# Patient Record
Sex: MALE | Race: WHITE | NOT HISPANIC OR LATINO | Employment: OTHER | URBAN - METROPOLITAN AREA
[De-identification: names, ages, dates, MRNs, and addresses within clinical notes are randomized per-mention and may not be internally consistent; named-entity substitution may affect disease eponyms.]

---

## 2022-05-29 ENCOUNTER — HOSPITAL ENCOUNTER (EMERGENCY)
Facility: HOSPITAL | Age: 77
Discharge: HOME | End: 2022-05-29
Attending: FAMILY MEDICINE

## 2022-05-29 VITALS
RESPIRATION RATE: 20 BRPM | BODY MASS INDEX: 25.31 KG/M2 | HEIGHT: 71 IN | TEMPERATURE: 98 F | DIASTOLIC BLOOD PRESSURE: 65 MMHG | SYSTOLIC BLOOD PRESSURE: 122 MMHG | OXYGEN SATURATION: 96 % | HEART RATE: 70 BPM | WEIGHT: 180.75 LBS

## 2022-05-29 DIAGNOSIS — N32.0 BLADDER OUTLET OBSTRUCTION: Primary | ICD-10-CM

## 2022-05-29 DIAGNOSIS — Z97.8 FOLEY CATHETER PRESENT: ICD-10-CM

## 2022-05-29 LAB
ALBUMIN SERPL BCP-MCNC: 3.6 G/DL (ref 3.5–5.2)
ALP SERPL-CCNC: 101 U/L (ref 55–135)
ALT SERPL W/O P-5'-P-CCNC: 19 U/L (ref 10–44)
ANION GAP SERPL CALC-SCNC: 15 MMOL/L (ref 8–16)
AST SERPL-CCNC: 17 U/L (ref 10–40)
BACTERIA #/AREA URNS HPF: ABNORMAL /HPF
BASOPHILS # BLD AUTO: 0.01 K/UL (ref 0–0.2)
BASOPHILS NFR BLD: 0.1 % (ref 0–1.9)
BILIRUB SERPL-MCNC: 0.7 MG/DL (ref 0.1–1)
BILIRUB UR QL STRIP: NEGATIVE
BUN SERPL-MCNC: 31 MG/DL (ref 8–23)
CALCIUM SERPL-MCNC: 9.4 MG/DL (ref 8.7–10.5)
CHLORIDE SERPL-SCNC: 102 MMOL/L (ref 95–110)
CLARITY UR: CLEAR
CO2 SERPL-SCNC: 22 MMOL/L (ref 23–29)
COLOR UR: YELLOW
CREAT SERPL-MCNC: 2 MG/DL (ref 0.5–1.4)
DIFFERENTIAL METHOD: ABNORMAL
EOSINOPHIL # BLD AUTO: 0 K/UL (ref 0–0.5)
EOSINOPHIL NFR BLD: 0.4 % (ref 0–8)
ERYTHROCYTE [DISTWIDTH] IN BLOOD BY AUTOMATED COUNT: 13.2 % (ref 11.5–14.5)
EST. GFR  (AFRICAN AMERICAN): 36.1 ML/MIN/1.73 M^2
EST. GFR  (NON AFRICAN AMERICAN): 31.3 ML/MIN/1.73 M^2
GLUCOSE SERPL-MCNC: 394 MG/DL (ref 70–110)
GLUCOSE UR QL STRIP: ABNORMAL
HCT VFR BLD AUTO: 33 % (ref 40–54)
HGB BLD-MCNC: 11.5 G/DL (ref 14–18)
HGB UR QL STRIP: ABNORMAL
IMM GRANULOCYTES # BLD AUTO: 0.01 K/UL (ref 0–0.04)
IMM GRANULOCYTES NFR BLD AUTO: 0.1 % (ref 0–0.5)
KETONES UR QL STRIP: NEGATIVE
LEUKOCYTE ESTERASE UR QL STRIP: ABNORMAL
LYMPHOCYTES # BLD AUTO: 1.4 K/UL (ref 1–4.8)
LYMPHOCYTES NFR BLD: 20.4 % (ref 18–48)
MCH RBC QN AUTO: 30 PG (ref 27–31)
MCHC RBC AUTO-ENTMCNC: 34.8 G/DL (ref 32–36)
MCV RBC AUTO: 86 FL (ref 82–98)
MICROSCOPIC COMMENT: ABNORMAL
MONOCYTES # BLD AUTO: 0.5 K/UL (ref 0.3–1)
MONOCYTES NFR BLD: 7.3 % (ref 4–15)
NEUTROPHILS # BLD AUTO: 4.8 K/UL (ref 1.8–7.7)
NEUTROPHILS NFR BLD: 71.7 % (ref 38–73)
NITRITE UR QL STRIP: NEGATIVE
NRBC BLD-RTO: 0 /100 WBC
PH UR STRIP: 7 [PH] (ref 5–8)
PLATELET # BLD AUTO: 173 K/UL (ref 150–450)
PMV BLD AUTO: 9.1 FL (ref 9.2–12.9)
POCT GLUCOSE: 327 MG/DL (ref 70–110)
POCT GLUCOSE: 347 MG/DL (ref 70–110)
POTASSIUM SERPL-SCNC: 3.5 MMOL/L (ref 3.5–5.1)
PROT SERPL-MCNC: 6.9 G/DL (ref 6–8.4)
PROT UR QL STRIP: NEGATIVE
RBC # BLD AUTO: 3.83 M/UL (ref 4.6–6.2)
RBC #/AREA URNS HPF: 9 /HPF (ref 0–4)
SODIUM SERPL-SCNC: 139 MMOL/L (ref 136–145)
SP GR UR STRIP: 1.01 (ref 1–1.03)
URN SPEC COLLECT METH UR: ABNORMAL
UROBILINOGEN UR STRIP-ACNC: NEGATIVE EU/DL
WBC # BLD AUTO: 6.67 K/UL (ref 3.9–12.7)
WBC #/AREA URNS HPF: 14 /HPF (ref 0–5)

## 2022-05-29 PROCEDURE — 85025 COMPLETE CBC W/AUTO DIFF WBC: CPT | Performed by: FAMILY MEDICINE

## 2022-05-29 PROCEDURE — 74176 CT ABD & PELVIS W/O CONTRAST: CPT | Mod: TC

## 2022-05-29 PROCEDURE — 74176 CT ABD & PELVIS W/O CONTRAST: CPT | Mod: 26,,, | Performed by: RADIOLOGY

## 2022-05-29 PROCEDURE — 80053 COMPREHEN METABOLIC PANEL: CPT | Performed by: FAMILY MEDICINE

## 2022-05-29 PROCEDURE — 96372 THER/PROPH/DIAG INJ SC/IM: CPT | Performed by: FAMILY MEDICINE

## 2022-05-29 PROCEDURE — 74176 CT ABDOMEN PELVIS WITHOUT CONTRAST: ICD-10-PCS | Mod: 26,,, | Performed by: RADIOLOGY

## 2022-05-29 PROCEDURE — 82962 GLUCOSE BLOOD TEST: CPT

## 2022-05-29 PROCEDURE — 99285 EMERGENCY DEPT VISIT HI MDM: CPT | Mod: 25

## 2022-05-29 PROCEDURE — 87086 URINE CULTURE/COLONY COUNT: CPT | Performed by: FAMILY MEDICINE

## 2022-05-29 PROCEDURE — 25000003 PHARM REV CODE 250: Performed by: FAMILY MEDICINE

## 2022-05-29 PROCEDURE — 81000 URINALYSIS NONAUTO W/SCOPE: CPT | Performed by: FAMILY MEDICINE

## 2022-05-29 PROCEDURE — 63600175 PHARM REV CODE 636 W HCPCS: Performed by: FAMILY MEDICINE

## 2022-05-29 PROCEDURE — 51702 INSERT TEMP BLADDER CATH: CPT

## 2022-05-29 RX ORDER — AMLODIPINE BESYLATE 5 MG/1
5 TABLET ORAL NIGHTLY
COMMUNITY

## 2022-05-29 RX ORDER — LANOLIN ALCOHOL/MO/W.PET/CERES
100 CREAM (GRAM) TOPICAL DAILY
COMMUNITY

## 2022-05-29 RX ORDER — AMOXICILLIN 250 MG/1
500 CAPSULE ORAL
Status: COMPLETED | OUTPATIENT
Start: 2022-05-29 | End: 2022-05-29

## 2022-05-29 RX ORDER — TEMAZEPAM 7.5 MG/1
15 CAPSULE ORAL NIGHTLY PRN
COMMUNITY

## 2022-05-29 RX ORDER — METFORMIN HYDROCHLORIDE 1000 MG/1
500 TABLET ORAL 2 TIMES DAILY WITH MEALS
COMMUNITY

## 2022-05-29 RX ORDER — SOLIFENACIN SUCCINATE 5 MG/1
10 TABLET, FILM COATED ORAL 2 TIMES DAILY
COMMUNITY

## 2022-05-29 RX ORDER — SIMVASTATIN 40 MG/1
40 TABLET, FILM COATED ORAL NIGHTLY
COMMUNITY

## 2022-05-29 RX ORDER — FOSINOPRIL SODIUM 40 MG/1
40 TABLET ORAL DAILY
COMMUNITY

## 2022-05-29 RX ADMIN — AMOXICILLIN 500 MG: 250 CAPSULE ORAL at 05:05

## 2022-05-29 RX ADMIN — INSULIN HUMAN 8 UNITS: 100 INJECTION, SOLUTION PARENTERAL at 04:05

## 2022-05-29 NOTE — ED NOTES
Patient states feeling much better after bladder emptied. NAD awaiting radiology for further orders. Denies any needs at this time.

## 2022-05-29 NOTE — ED NOTES
CBG remains elevated. Dr. Bee notified. Patient states glucophage due in 2 hours so will not give further insulin coverage. Leg bag attached for indwellilng catheter with instructions for use. Follow up explained with urology for removal. Voices understanding.

## 2022-05-30 LAB — BACTERIA UR CULT: NO GROWTH

## 2022-05-30 NOTE — ED PROVIDER NOTES
Encounter Date: 5/29/2022       History     Chief Complaint   Patient presents with    Flank Pain     Right flank pain intermittently today.  Now constant.  Also urinary incontinence for 50 days progressively worsening. Slightly relieved if on right side.     77 yo presents to ED in acute distress complaining of pain in the R flank today, he has had progressive urinary incontinence over the last 50 days, he is normally seen at the [facility] and apparently has a pending appointment with the urologist they are trying to get 1 scheduled he has no history of fever chills nausea vomiting or diarrhea        Review of patient's allergies indicates:  No Known Allergies  No past medical history on file.  No past surgical history on file.  No family history on file.     Review of Systems   Constitutional: Negative for fever.   HENT: Negative for sore throat.    Eyes: Negative for itching.   Respiratory: Negative for shortness of breath.    Cardiovascular: Negative for chest pain.   Gastrointestinal: Negative for nausea.   Endocrine: Negative for heat intolerance.   Genitourinary: Positive for frequency. Negative for dysuria.   Musculoskeletal: Negative for back pain.   Skin: Negative for rash.   Neurological: Negative for dizziness and weakness.   Hematological: Does not bruise/bleed easily.       Physical Exam     Initial Vitals [05/29/22 0304]   BP Pulse Resp Temp SpO2   (!) 168/75 70 20 97.8 °F (36.6 °C) (!) 94 %      MAP       --         Physical Exam    Nursing note and vitals reviewed.  Constitutional: He appears well-developed and well-nourished. He is not diaphoretic. No distress.   HENT:   Head: Normocephalic and atraumatic.   Nose: Nose normal.   Mouth/Throat: Oropharynx is clear and moist. No oropharyngeal exudate.   Eyes: EOM are normal.   Neck: Neck supple. No tracheal deviation present.   Normal range of motion.  Cardiovascular: Normal rate and regular rhythm.   No murmur heard.  Pulmonary/Chest:  Breath sounds normal. No stridor. No respiratory distress. He has no rales.   Abdominal: Abdomen is soft. He exhibits distension. He exhibits no mass. There is no abdominal tenderness.   Positive but large suprapubic mass which may be distended bladder There is no rebound.   Musculoskeletal:         General: No edema. Normal range of motion.      Cervical back: Normal range of motion and neck supple.     Lymphadenopathy:     He has no cervical adenopathy.   Neurological: He is alert and oriented to person, place, and time. He has normal strength.   Skin: Skin is warm and dry. Capillary refill takes less than 2 seconds. No pallor.   Psychiatric: He has a normal mood and affect.         ED Course   Procedures  Labs Reviewed   CBC W/ AUTO DIFFERENTIAL - Abnormal; Notable for the following components:       Result Value    RBC 3.83 (*)     Hemoglobin 11.5 (*)     Hematocrit 33.0 (*)     MPV 9.1 (*)     All other components within normal limits   COMPREHENSIVE METABOLIC PANEL - Abnormal; Notable for the following components:    CO2 22 (*)     Glucose 394 (*)     BUN 31 (*)     Creatinine 2.0 (*)     eGFR if  36.1 (*)     eGFR if non  31.3 (*)     All other components within normal limits   URINALYSIS, REFLEX TO URINE CULTURE - Abnormal; Notable for the following components:    Glucose, UA 2+ (*)     Occult Blood UA 2+ (*)     Leukocytes, UA 1+ (*)     All other components within normal limits    Narrative:     Preferred Collection Type->Urine, Clean Catch  Specimen Source->Urine   URINALYSIS MICROSCOPIC - Abnormal; Notable for the following components:    RBC, UA 9 (*)     WBC, UA 14 (*)     All other components within normal limits    Narrative:     Preferred Collection Type->Urine, Clean Catch  Specimen Source->Urine   POCT GLUCOSE - Abnormal; Notable for the following components:    POCT Glucose 327 (*)     All other components within normal limits   POCT GLUCOSE - Abnormal; Notable for  the following components:    POCT Glucose 347 (*)     All other components within normal limits   CULTURE, URINE          Imaging Results          CT Abdomen Pelvis  Without Contrast (Final result)  Result time 05/29/22 08:21:36    Final result by Pretty Phillips MD (05/29/22 08:21:36)                 Impression:      Mild bilateral hydronephrosis and hydroureter of uncertain etiology.  No obstructing ureteral calculus identified.  Follow-up with retrograde pyelogram may be appropriate.    Probable cyst in the liver may be confirmed with follow-up ultrasound.    Atherosclerosis.  Colonic diverticula.  Enlarged prostate.      Electronically signed by: Pretty Phillips  Date:    05/29/2022  Time:    08:21             Narrative:    EXAMINATION:  CT ABDOMEN PELVIS WITHOUT CONTRAST    CLINICAL HISTORY:  Abdominal pain, acute, nonlocalized;    TECHNIQUE:  Low dose axial images, sagittal and coronal reformations were obtained from the lung bases to the pubic symphysis.  Neither oral nor IV contrast was administered    COMPARISON:  None    FINDINGS:  There are no significant findings in the visualized portions of the lung bases.    Coronary artery calcifications are present.  A 1.4 cm fluid attenuation left lobe liver lesion is likely a cyst as seen on series 2, image 17.  The gallbladder is present.  The spleen has a size at the upper limits of normal.  The adrenal glands, pancreas are grossly unremarkable.    There is mild bilateral perinephric fat stranding.  There is mild bilateral hydronephrosis and hydroureter to the level of the urinary bladder without visualization of an obstructing calculus or mass.  The bladder is decompressed with a Serrano catheter in place.  The prostate is enlarged.    There are numerous distal colonic diverticula.  No bowel obstruction or inflammation.  Generalized fecal stasis in the colon.  Normal appendix visualized.  Extensive calcific plaque seen in the aorta.  No aneurysm.  There  is no free fluid or free air.                                 Medications   insulin regular injection 8 Units 0.08 mL (8 Units Subcutaneous Given 5/29/22 0435)   amoxicillin capsule 500 mg (500 mg Oral Given 5/29/22 6192)     Medical Decision Making:   ED Management:  Serrano catheter in her bladder yielded over 1500 cc of clear urine                      Clinical Impression:   Final diagnoses:  [N32.0] Bladder outlet obstruction (Primary)  [Z97.8] Serrano catheter present          ED Disposition Condition    Discharge Stable        ED Prescriptions     None        Follow-up Information    None          Nicolas Bee MD  05/30/22 3363

## 2024-06-22 NOTE — ED NOTES
HPI   Chief Complaint   Patient presents with    Neck Pain    Shortness of Breath     EMS gave solumedrol, duoneb, and benadryl       This is a 43-year-old male who presents emergency room chief complaint of acute onset left sided neck pain with spasm.  This occurred while he was sitting around watching TV this evening.  Patient states he was laying on angle when the spasm began.  The pain is worse with movement of his head from side-to-side.  He denies any headache, chest pain, hypertensions, cough, shortness breath, nausea vomiting.  Patient did not take anything for his pain prior to arrival.  He denies any other injuries.        History provided by:  Patient and medical records                      [place] Coma Scale Score: 15                     Patient History   Past Medical History:   Diagnosis Date    Anxiety     Asthma (HHS-HCC)     Chronic back pain     Depression     Diverticulitis     Fatty liver     IBS (irritable bowel syndrome)     TIA (transient ischemic attack)      Past Surgical History:   Procedure Laterality Date    ELBOW ARTHROPLASTY Left     X2     Family History   Problem Relation Name Age of Onset    Diabetes Mother      Esophageal cancer Father       Social History     Tobacco Use    Smoking status: Every Day     Types: Cigars    Smokeless tobacco: Current    Tobacco comments:     Pt vapes   Substance Use Topics    Alcohol use: Yes     Comment: 1x a week    Drug use: Yes     Types: Marijuana     Comment: daily       Physical Exam   ED Triage Vitals [06/22/24 0328]   Temperature Heart Rate Respirations BP   36.3 °C (97.3 °F) 87 18 150/80      Pulse Ox Temp Source Heart Rate Source Patient Position   96 % Temporal Monitor Sitting      BP Location FiO2 (%)     Right arm --       Physical Exam  Vitals and nursing note reviewed.   Constitutional:       Appearance: Normal appearance. He is normal weight.   HENT:      Head: Normocephalic and atraumatic.      Right Ear: Tympanic membrane, ear canal  To ct scan via wheelchair. Lab result of blood glucose 394. Talked with patient and states blood sugar normally runs < 200 but has recently been on the uptick and has discussed with MD possibility of having to start SQ insulin.    and external ear normal.      Left Ear: Tympanic membrane, ear canal and external ear normal.      Nose: Nose normal.      Mouth/Throat:      Mouth: Mucous membranes are moist.      Pharynx: Oropharynx is clear.   Eyes:      Extraocular Movements: Extraocular movements intact.      Conjunctiva/sclera: Conjunctivae normal.      Pupils: Pupils are equal, round, and reactive to light.   Neck:      Trachea: Trachea and phonation normal.        Comments: Left-sided SCM and trapezial tenderness with spasm no midline tenderness trachea midline supple no JVD or carotid bruits  Cardiovascular:      Rate and Rhythm: Normal rate and regular rhythm.      Pulses: Normal pulses.      Heart sounds: Normal heart sounds.   Pulmonary:      Effort: Pulmonary effort is normal.      Breath sounds: Normal breath sounds.   Abdominal:      General: Abdomen is flat. Bowel sounds are normal.      Palpations: Abdomen is soft.   Musculoskeletal:         General: Tenderness present. Normal range of motion.      Cervical back: Torticollis and tenderness present. Muscular tenderness present. No spinous process tenderness.      Thoracic back: Normal.      Lumbar back: Normal.        Back:    Skin:     General: Skin is warm and dry.      Capillary Refill: Capillary refill takes less than 2 seconds.   Neurological:      General: No focal deficit present.      Mental Status: He is alert and oriented to person, place, and time. Mental status is at baseline.      Sensory: No sensory deficit.   Psychiatric:         Mood and Affect: Mood normal.         Behavior: Behavior normal.         Thought Content: Thought content normal.         Judgment: Judgment normal.         ED Course & MDM   Diagnoses as of 06/22/24 0418   Torticollis, acute       Medical Decision Making  Temperature 36 3 heart rate 87 respirations 18 blood pressure 150/80 pulse ox is 96% on room air  Patient's exam is consistent with acute left-sided cervical spasm/torticollis.  He was  given lidocaine patch and 5 mg of Valium p.o.  He was also given 2 mg of Decadron p.o. and discharged home with prescription for Robaxin, naproxen and topical lidocaine patches.  He was advised to follow with his PCP return with any concerns or worsening of symptoms all questions answered prior to discharge        Procedure  Procedures     Eagle Baptiste PA-C  06/22/24 8660